# Patient Record
Sex: FEMALE | Race: WHITE | Employment: OTHER | URBAN - METROPOLITAN AREA
[De-identification: names, ages, dates, MRNs, and addresses within clinical notes are randomized per-mention and may not be internally consistent; named-entity substitution may affect disease eponyms.]

---

## 2018-04-04 ENCOUNTER — TELEPHONE (OUTPATIENT)
Dept: ORTHOPEDIC SURGERY | Age: 83
End: 2018-04-04

## 2018-04-04 ENCOUNTER — OFFICE VISIT (OUTPATIENT)
Dept: ORTHOPEDIC SURGERY | Age: 83
End: 2018-04-04

## 2018-04-04 VITALS — WEIGHT: 184 LBS | BODY MASS INDEX: 33.86 KG/M2 | HEIGHT: 62 IN

## 2018-04-04 DIAGNOSIS — M17.12 PRIMARY OSTEOARTHRITIS OF LEFT KNEE: ICD-10-CM

## 2018-04-04 DIAGNOSIS — I73.9 CLAUDICATION OF LOWER EXTREMITY (HCC): Primary | ICD-10-CM

## 2018-04-04 DIAGNOSIS — M79.605 PAIN OF LEFT LOWER EXTREMITY: ICD-10-CM

## 2018-04-04 PROBLEM — Z86.73 HISTORY OF TRANSIENT ISCHEMIC ATTACK: Status: ACTIVE | Noted: 2017-07-17

## 2018-04-04 PROBLEM — Z79.01 ANTICOAGULATED: Status: ACTIVE | Noted: 2017-07-17

## 2018-04-04 PROBLEM — Z86.718 HISTORY OF THROMBOEMBOLISM: Status: ACTIVE | Noted: 2017-07-17

## 2018-04-04 PROBLEM — I16.0 HYPERTENSIVE URGENCY: Status: ACTIVE | Noted: 2017-07-17

## 2018-04-04 PROBLEM — H81.11 BENIGN PAROXYSMAL POSITIONAL VERTIGO OF RIGHT EAR: Status: ACTIVE | Noted: 2017-07-16

## 2018-04-04 PROBLEM — E03.9 HYPOTHYROIDISM: Status: ACTIVE | Noted: 2017-07-17

## 2018-04-04 PROCEDURE — 1090F PRES/ABSN URINE INCON ASSESS: CPT | Performed by: ORTHOPAEDIC SURGERY

## 2018-04-04 PROCEDURE — G8427 DOCREV CUR MEDS BY ELIG CLIN: HCPCS | Performed by: ORTHOPAEDIC SURGERY

## 2018-04-04 PROCEDURE — G8400 PT W/DXA NO RESULTS DOC: HCPCS | Performed by: ORTHOPAEDIC SURGERY

## 2018-04-04 PROCEDURE — 1123F ACP DISCUSS/DSCN MKR DOCD: CPT | Performed by: ORTHOPAEDIC SURGERY

## 2018-04-04 PROCEDURE — G8417 CALC BMI ABV UP PARAM F/U: HCPCS | Performed by: ORTHOPAEDIC SURGERY

## 2018-04-04 PROCEDURE — 4040F PNEUMOC VAC/ADMIN/RCVD: CPT | Performed by: ORTHOPAEDIC SURGERY

## 2018-04-04 PROCEDURE — 99204 OFFICE O/P NEW MOD 45 MIN: CPT | Performed by: ORTHOPAEDIC SURGERY

## 2018-04-04 PROCEDURE — 1036F TOBACCO NON-USER: CPT | Performed by: ORTHOPAEDIC SURGERY

## 2018-04-04 RX ORDER — SIMVASTATIN 20 MG
20 TABLET ORAL
Status: ON HOLD | COMMUNITY
End: 2019-04-12 | Stop reason: ALTCHOICE

## 2018-04-04 RX ORDER — LEVOTHYROXINE SODIUM 88 MCG
TABLET ORAL
Status: ON HOLD | COMMUNITY
Start: 2018-01-15 | End: 2019-04-13 | Stop reason: SDUPTHER

## 2018-04-04 RX ORDER — LIDOCAINE 50 MG/G
OINTMENT TOPICAL
COMMUNITY
Start: 2018-04-03 | End: 2018-04-04 | Stop reason: SDUPTHER

## 2018-04-04 ASSESSMENT — ENCOUNTER SYMPTOMS
BACK PAIN: 1
RESPIRATORY NEGATIVE: 1
HEARTBURN: 1
EYES NEGATIVE: 1

## 2018-04-10 ENCOUNTER — OFFICE VISIT (OUTPATIENT)
Dept: ORTHOPEDIC SURGERY | Age: 83
End: 2018-04-10

## 2018-04-10 VITALS — BODY MASS INDEX: 33.86 KG/M2 | HEIGHT: 62 IN | RESPIRATION RATE: 16 BRPM | WEIGHT: 184 LBS

## 2018-04-10 DIAGNOSIS — M17.12 PRIMARY OSTEOARTHRITIS OF LEFT KNEE: Primary | ICD-10-CM

## 2018-04-10 PROCEDURE — 4040F PNEUMOC VAC/ADMIN/RCVD: CPT | Performed by: ORTHOPAEDIC SURGERY

## 2018-04-10 PROCEDURE — 20610 DRAIN/INJ JOINT/BURSA W/O US: CPT | Performed by: ORTHOPAEDIC SURGERY

## 2018-04-10 PROCEDURE — 1036F TOBACCO NON-USER: CPT | Performed by: ORTHOPAEDIC SURGERY

## 2018-04-10 PROCEDURE — 99213 OFFICE O/P EST LOW 20 MIN: CPT | Performed by: ORTHOPAEDIC SURGERY

## 2018-04-10 PROCEDURE — 1123F ACP DISCUSS/DSCN MKR DOCD: CPT | Performed by: ORTHOPAEDIC SURGERY

## 2018-04-10 PROCEDURE — G8400 PT W/DXA NO RESULTS DOC: HCPCS | Performed by: ORTHOPAEDIC SURGERY

## 2018-04-10 PROCEDURE — G8427 DOCREV CUR MEDS BY ELIG CLIN: HCPCS | Performed by: ORTHOPAEDIC SURGERY

## 2018-04-10 PROCEDURE — G8417 CALC BMI ABV UP PARAM F/U: HCPCS | Performed by: ORTHOPAEDIC SURGERY

## 2018-04-10 PROCEDURE — 1090F PRES/ABSN URINE INCON ASSESS: CPT | Performed by: ORTHOPAEDIC SURGERY

## 2018-04-10 RX ORDER — HYDROCODONE BITARTRATE AND ACETAMINOPHEN 5; 325 MG/1; MG/1
TABLET ORAL
Status: ON HOLD | COMMUNITY
Start: 2018-04-05 | End: 2019-04-12 | Stop reason: ALTCHOICE

## 2018-04-10 RX ORDER — IBUPROFEN 400 MG/1
TABLET ORAL
COMMUNITY
Start: 2018-04-05 | End: 2018-04-10 | Stop reason: SINTOL

## 2018-04-10 ASSESSMENT — ENCOUNTER SYMPTOMS
RESPIRATORY NEGATIVE: 1
EYES NEGATIVE: 1
HEARTBURN: 1
BACK PAIN: 1

## 2019-04-12 ENCOUNTER — HOSPITAL ENCOUNTER (OUTPATIENT)
Age: 84
Setting detail: OBSERVATION
Discharge: HOME | End: 2019-04-13
Attending: EMERGENCY MEDICINE | Admitting: HOSPITALIST
Payer: MEDICARE

## 2019-04-12 ENCOUNTER — APPOINTMENT (OUTPATIENT)
Dept: GENERAL RADIOLOGY | Age: 84
End: 2019-04-12
Payer: MEDICARE

## 2019-04-12 ENCOUNTER — APPOINTMENT (OUTPATIENT)
Dept: CT IMAGING | Age: 84
End: 2019-04-12
Payer: MEDICARE

## 2019-04-12 ENCOUNTER — APPOINTMENT (OUTPATIENT)
Dept: ULTRASOUND IMAGING | Age: 84
End: 2019-04-12
Payer: MEDICARE

## 2019-04-12 DIAGNOSIS — R42 VERTIGO: ICD-10-CM

## 2019-04-12 DIAGNOSIS — R53.83 OTHER FATIGUE: ICD-10-CM

## 2019-04-12 DIAGNOSIS — M54.2 NECK PAIN: ICD-10-CM

## 2019-04-12 DIAGNOSIS — R42 DIZZINESS: Primary | ICD-10-CM

## 2019-04-12 PROBLEM — I50.9 MILD CONGESTIVE HEART FAILURE (HCC): Status: ACTIVE | Noted: 2019-04-12

## 2019-04-12 PROBLEM — R29.90 STROKE-LIKE SYMPTOMS: Status: ACTIVE | Noted: 2019-04-12

## 2019-04-12 LAB
ALBUMIN SERPL-MCNC: 3 GM/DL (ref 3.4–5)
ALP BLD-CCNC: 82 IU/L (ref 40–129)
ALT SERPL-CCNC: 14 U/L (ref 10–40)
ANION GAP SERPL CALCULATED.3IONS-SCNC: 10 MMOL/L (ref 4–16)
AST SERPL-CCNC: 13 IU/L (ref 15–37)
BACTERIA: NEGATIVE /HPF
BANDED NEUTROPHILS ABSOLUTE COUNT: 0.11 K/CU MM
BANDED NEUTROPHILS RELATIVE PERCENT: 2 % (ref 5–11)
BILIRUB SERPL-MCNC: 0.7 MG/DL (ref 0–1)
BILIRUBIN URINE: NEGATIVE MG/DL
BLOOD, URINE: NEGATIVE
BUN BLDV-MCNC: 21 MG/DL (ref 6–23)
CALCIUM SERPL-MCNC: 8.1 MG/DL (ref 8.3–10.6)
CHLORIDE BLD-SCNC: 103 MMOL/L (ref 99–110)
CHP ED QC CHECK: YES
CLARITY: CLEAR
CO2: 22 MMOL/L (ref 21–32)
COLOR: ABNORMAL
CREAT SERPL-MCNC: 0.8 MG/DL (ref 0.6–1.1)
DIFFERENTIAL TYPE: ABNORMAL
EOSINOPHILS ABSOLUTE: 0.1 K/CU MM
EOSINOPHILS RELATIVE PERCENT: 1 % (ref 0–3)
GFR AFRICAN AMERICAN: >60 ML/MIN/1.73M2
GFR NON-AFRICAN AMERICAN: >60 ML/MIN/1.73M2
GLUCOSE BLD-MCNC: 129 MG/DL
GLUCOSE BLD-MCNC: 129 MG/DL (ref 70–99)
GLUCOSE BLD-MCNC: 136 MG/DL (ref 70–99)
GLUCOSE, URINE: NEGATIVE MG/DL
HCT VFR BLD CALC: 35.7 % (ref 37–47)
HEMOGLOBIN: 11.5 GM/DL (ref 12.5–16)
INR BLD: 1.55 INDEX
KETONES, URINE: NEGATIVE MG/DL
LEUKOCYTE ESTERASE, URINE: ABNORMAL
LYMPHOCYTES ABSOLUTE: 1 K/CU MM
LYMPHOCYTES RELATIVE PERCENT: 18 % (ref 24–44)
MCH RBC QN AUTO: 31.2 PG (ref 27–31)
MCHC RBC AUTO-ENTMCNC: 32.2 % (ref 32–36)
MCV RBC AUTO: 96.7 FL (ref 78–100)
MONOCYTES ABSOLUTE: 0.4 K/CU MM
MONOCYTES RELATIVE PERCENT: 7 % (ref 0–4)
NITRITE URINE, QUANTITATIVE: NEGATIVE
PDW BLD-RTO: 13 % (ref 11.7–14.9)
PH, URINE: 7 (ref 5–8)
PLATELET # BLD: 149 K/CU MM (ref 140–440)
PLT MORPHOLOGY: ABNORMAL
PMV BLD AUTO: 9.6 FL (ref 7.5–11.1)
POLYCHROMASIA: ABNORMAL
POTASSIUM SERPL-SCNC: 3.8 MMOL/L (ref 3.5–5.1)
PRO-BNP: 2614 PG/ML
PROTEIN UA: NEGATIVE MG/DL
PROTHROMBIN TIME: 17.6 SECONDS (ref 9.12–12.5)
RBC # BLD: 3.69 M/CU MM (ref 4.2–5.4)
RBC # BLD: ABNORMAL 10*6/UL
RBC URINE: 1 /HPF (ref 0–6)
SEGMENTED NEUTROPHILS ABSOLUTE COUNT: 3.8 K/CU MM
SEGMENTED NEUTROPHILS RELATIVE PERCENT: 72 % (ref 36–66)
SODIUM BLD-SCNC: 135 MMOL/L (ref 135–145)
SPECIFIC GRAVITY UA: 1.04 (ref 1–1.03)
SPECIFIC GRAVITY UA: ABNORMAL (ref 1–1.03)
SQUAMOUS EPITHELIAL: 3 /HPF
TOTAL PROTEIN: 5.3 GM/DL (ref 6.4–8.2)
TRICHOMONAS: ABNORMAL /HPF
TROPONIN T: <0.01 NG/ML
TROPONIN T: <0.01 NG/ML
TSH HIGH SENSITIVITY: 0.49 UIU/ML (ref 0.27–4.2)
UROBILINOGEN, URINE: NORMAL MG/DL (ref 0.2–1)
WBC # BLD: 5.4 K/CU MM (ref 4–10.5)
WBC # BLD: ABNORMAL 10*3/UL
WBC UA: <1 /HPF (ref 0–5)
YEAST: ABNORMAL /HPF

## 2019-04-12 PROCEDURE — 70496 CT ANGIOGRAPHY HEAD: CPT

## 2019-04-12 PROCEDURE — 85610 PROTHROMBIN TIME: CPT

## 2019-04-12 PROCEDURE — 2580000003 HC RX 258: Performed by: PHYSICIAN ASSISTANT

## 2019-04-12 PROCEDURE — 83880 ASSAY OF NATRIURETIC PEPTIDE: CPT

## 2019-04-12 PROCEDURE — 82962 GLUCOSE BLOOD TEST: CPT

## 2019-04-12 PROCEDURE — 6370000000 HC RX 637 (ALT 250 FOR IP): Performed by: NURSE PRACTITIONER

## 2019-04-12 PROCEDURE — 85027 COMPLETE CBC AUTOMATED: CPT

## 2019-04-12 PROCEDURE — 70498 CT ANGIOGRAPHY NECK: CPT

## 2019-04-12 PROCEDURE — G0378 HOSPITAL OBSERVATION PER HR: HCPCS

## 2019-04-12 PROCEDURE — 36415 COLL VENOUS BLD VENIPUNCTURE: CPT

## 2019-04-12 PROCEDURE — 70450 CT HEAD/BRAIN W/O DYE: CPT

## 2019-04-12 PROCEDURE — 85007 BL SMEAR W/DIFF WBC COUNT: CPT

## 2019-04-12 PROCEDURE — 99285 EMERGENCY DEPT VISIT HI MDM: CPT

## 2019-04-12 PROCEDURE — 93005 ELECTROCARDIOGRAM TRACING: CPT | Performed by: PHYSICIAN ASSISTANT

## 2019-04-12 PROCEDURE — 2580000003 HC RX 258: Performed by: NURSE PRACTITIONER

## 2019-04-12 PROCEDURE — 80053 COMPREHEN METABOLIC PANEL: CPT

## 2019-04-12 PROCEDURE — 81001 URINALYSIS AUTO W/SCOPE: CPT

## 2019-04-12 PROCEDURE — 84484 ASSAY OF TROPONIN QUANT: CPT

## 2019-04-12 PROCEDURE — 84443 ASSAY THYROID STIM HORMONE: CPT

## 2019-04-12 PROCEDURE — 96374 THER/PROPH/DIAG INJ IV PUSH: CPT

## 2019-04-12 PROCEDURE — 71045 X-RAY EXAM CHEST 1 VIEW: CPT

## 2019-04-12 PROCEDURE — 6360000004 HC RX CONTRAST MEDICATION: Performed by: PHYSICIAN ASSISTANT

## 2019-04-12 PROCEDURE — 6360000002 HC RX W HCPCS: Performed by: NURSE PRACTITIONER

## 2019-04-12 PROCEDURE — 93880 EXTRACRANIAL BILAT STUDY: CPT

## 2019-04-12 PROCEDURE — 93010 ELECTROCARDIOGRAM REPORT: CPT | Performed by: INTERNAL MEDICINE

## 2019-04-12 RX ORDER — ATORVASTATIN CALCIUM 10 MG/1
10 TABLET, FILM COATED ORAL DAILY
Status: DISCONTINUED | OUTPATIENT
Start: 2019-04-12 | End: 2019-04-13 | Stop reason: HOSPADM

## 2019-04-12 RX ORDER — ATORVASTATIN CALCIUM 10 MG/1
10 TABLET, FILM COATED ORAL DAILY
COMMUNITY

## 2019-04-12 RX ORDER — FAMOTIDINE 20 MG/1
20 TABLET, FILM COATED ORAL 2 TIMES DAILY
Status: DISCONTINUED | OUTPATIENT
Start: 2019-04-12 | End: 2019-04-13 | Stop reason: HOSPADM

## 2019-04-12 RX ORDER — ACETAMINOPHEN 80 MG
TABLET,CHEWABLE ORAL
Status: DISPENSED
Start: 2019-04-12 | End: 2019-04-13

## 2019-04-12 RX ORDER — LABETALOL HYDROCHLORIDE 5 MG/ML
10 INJECTION, SOLUTION INTRAVENOUS EVERY 10 MIN PRN
Status: DISCONTINUED | OUTPATIENT
Start: 2019-04-12 | End: 2019-04-13 | Stop reason: HOSPADM

## 2019-04-12 RX ORDER — SODIUM CHLORIDE 0.9 % (FLUSH) 0.9 %
10 SYRINGE (ML) INJECTION EVERY 12 HOURS SCHEDULED
Status: DISCONTINUED | OUTPATIENT
Start: 2019-04-12 | End: 2019-04-13 | Stop reason: HOSPADM

## 2019-04-12 RX ORDER — LEVOTHYROXINE SODIUM ANHYDROUS 100 UG/5ML
88 INJECTION, POWDER, LYOPHILIZED, FOR SOLUTION INTRAVENOUS
Status: DISCONTINUED | OUTPATIENT
Start: 2019-04-13 | End: 2019-04-12 | Stop reason: RX

## 2019-04-12 RX ORDER — SODIUM CHLORIDE 0.9 % (FLUSH) 0.9 %
10 SYRINGE (ML) INJECTION PRN
Status: DISCONTINUED | OUTPATIENT
Start: 2019-04-12 | End: 2019-04-13 | Stop reason: HOSPADM

## 2019-04-12 RX ORDER — FUROSEMIDE 10 MG/ML
20 INJECTION INTRAMUSCULAR; INTRAVENOUS 2 TIMES DAILY
Status: DISCONTINUED | OUTPATIENT
Start: 2019-04-12 | End: 2019-04-13

## 2019-04-12 RX ORDER — MECLIZINE HCL 12.5 MG/1
12.5 TABLET ORAL 3 TIMES DAILY PRN
Status: DISCONTINUED | OUTPATIENT
Start: 2019-04-12 | End: 2019-04-13 | Stop reason: HOSPADM

## 2019-04-12 RX ORDER — FUROSEMIDE 20 MG/1
20 TABLET ORAL 2 TIMES DAILY
COMMUNITY

## 2019-04-12 RX ORDER — 0.9 % SODIUM CHLORIDE 0.9 %
10 VIAL (ML) INJECTION
Status: COMPLETED | OUTPATIENT
Start: 2019-04-12 | End: 2019-04-12

## 2019-04-12 RX ORDER — LEVOTHYROXINE SODIUM 88 UG/1
88 TABLET ORAL DAILY
Status: DISCONTINUED | OUTPATIENT
Start: 2019-04-13 | End: 2019-04-13 | Stop reason: HOSPADM

## 2019-04-12 RX ORDER — ERGOCALCIFEROL (VITAMIN D2) 1250 MCG
50000 CAPSULE ORAL WEEKLY
COMMUNITY

## 2019-04-12 RX ORDER — ERGOCALCIFEROL 1.25 MG/1
50000 CAPSULE ORAL WEEKLY
Status: DISCONTINUED | OUTPATIENT
Start: 2019-04-12 | End: 2019-04-13 | Stop reason: HOSPADM

## 2019-04-12 RX ORDER — ONDANSETRON 2 MG/ML
4 INJECTION INTRAMUSCULAR; INTRAVENOUS EVERY 6 HOURS PRN
Status: DISCONTINUED | OUTPATIENT
Start: 2019-04-12 | End: 2019-04-13 | Stop reason: HOSPADM

## 2019-04-12 RX ADMIN — IOPAMIDOL 100 ML: 755 INJECTION, SOLUTION INTRAVENOUS at 10:47

## 2019-04-12 RX ADMIN — FUROSEMIDE 20 MG: 10 INJECTION, SOLUTION INTRAVENOUS at 17:55

## 2019-04-12 RX ADMIN — SODIUM CHLORIDE, PRESERVATIVE FREE 10 ML: 5 INJECTION INTRAVENOUS at 22:13

## 2019-04-12 RX ADMIN — FAMOTIDINE 20 MG: 20 TABLET ORAL at 22:12

## 2019-04-12 RX ADMIN — METOPROLOL TARTRATE 12.5 MG: 25 TABLET ORAL at 22:11

## 2019-04-12 RX ADMIN — SODIUM CHLORIDE 10 ML: 9 INJECTION, SOLUTION INTRAMUSCULAR; INTRAVENOUS; SUBCUTANEOUS at 10:47

## 2019-04-12 RX ADMIN — APIXABAN 5 MG: 5 TABLET, FILM COATED ORAL at 22:11

## 2019-04-12 RX ADMIN — ATORVASTATIN CALCIUM 10 MG: 10 TABLET, FILM COATED ORAL at 22:11

## 2019-04-12 RX ADMIN — ERGOCALCIFEROL 50000 UNITS: 1.25 CAPSULE ORAL at 17:55

## 2019-04-12 RX ADMIN — SODIUM CHLORIDE, PRESERVATIVE FREE 10 ML: 5 INJECTION INTRAVENOUS at 17:56

## 2019-04-12 ASSESSMENT — PAIN DESCRIPTION - PAIN TYPE
TYPE: ACUTE PAIN
TYPE: ACUTE PAIN

## 2019-04-12 ASSESSMENT — PAIN DESCRIPTION - LOCATION
LOCATION: NECK
LOCATION: BACK;NECK

## 2019-04-12 ASSESSMENT — PAIN DESCRIPTION - ORIENTATION
ORIENTATION: LEFT
ORIENTATION: LEFT

## 2019-04-12 ASSESSMENT — PAIN SCALES - GENERAL
PAINLEVEL_OUTOF10: 3
PAINLEVEL_OUTOF10: 3

## 2019-04-12 ASSESSMENT — PAIN DESCRIPTION - PROGRESSION: CLINICAL_PROGRESSION: NOT CHANGED

## 2019-04-12 ASSESSMENT — PAIN DESCRIPTION - DESCRIPTORS: DESCRIPTORS: ACHING

## 2019-04-12 ASSESSMENT — PAIN DESCRIPTION - ONSET: ONSET: ON-GOING

## 2019-04-12 ASSESSMENT — PAIN DESCRIPTION - FREQUENCY: FREQUENCY: CONTINUOUS

## 2019-04-12 NOTE — ED TRIAGE NOTES
Patient presents to ER today for c/o dizziness that started this morning. patient also reports nausea and vomiting, but was given 4 mg of IV zofran PTa and states nausea has subsided.  Patient c/o left sided dull neck pain starting this morning as well

## 2019-04-12 NOTE — ED PROVIDER NOTES
made by myself in conjunction with the [facility]. For all further details of the patient's emergency department visit, please see their documentation.     (Please note that portions of this note may have been completed with a voice recognition program. Efforts were made to edit the dictations but occasionally words are mis-transcribed.)    MD Brian Thurman MD  04/12/19 [id number]

## 2019-04-12 NOTE — ED PROVIDER NOTES
Did not see patient, troponin EKG only    The Ekg interpreted by me shows  atrial fibrillation with a rate of 81  Axis is   Normal  QTc is  normal  Intervals and Durations are unremarkable.       ST Segments: no acute change  No significant change from prior EKG dated 1-           Ilya Medina MD  04/12/19 2513

## 2019-04-12 NOTE — ED NOTES
Bed: ED-26  Expected date:   Expected time:   Means of arrival:   Comments:  ryder Willson RN  04/12/19 1016

## 2019-04-12 NOTE — ED PROVIDER NOTES
Patient Identification  Tony Ortez is a 80 y.o. female    Chief Complaint  Dizziness (HX of vertigo); Nausea (given 4mg IV zofran pta by ems, states nausea has subsided.); and Neck Pain (left sided dull ache)      HPI  (History provided by patient)  This is a 80 y.o. female with h/o BPPV, CKD, CVA, a fib who was brought in by EMS for chief complaint of dizziness, nausea, neck pain. Patient reports she went to bed at approximately midnight last night. She woke up this morning feeling lightheaded, reports intermittent spinning sensation. She has a history of vertigo in the past and states this feels different. She states that she feels as though she just can't get out of bed, feels generally weak, denies lateralization. Also notes pain in the left side of her neck that is a dull ache that is 3 out of 10, reports when she turns her head to the left her dizziness worsens and pain worsens. She notes that this morning she had some nausea but reports this is improved, notes upper abdominal \"aching\" this morning that has resolved. Denies chest pain or shortness of breath. No coughing. No falls. No sick contacts. REVIEW OF SYSTEMS    Constitutional:  Denies fever, chills.  + dizziness, fatigue, general weakness  HENT:  Denies sore throat or ear pain   Eyes: Denies vision changes, eye pain  Cardiovascular:  Denies chest pain, syncope  Respiratory:  Denies shortness of breath, cough   GI:  Denies abdominal pain, vomiting.  + abdominal pain  :  Denies dysuria, discharge  Musculoskeletal:  Denies joint pain. + neck pain  Skin:  Denies rash, pruritis  Neurologic:  Denies headache, focal weakness, or sensory changes     See HPI and nursing notes for additional information     I have reviewed the following nursing documentation:  Allergies:    Allergies   Allergen Reactions    Asa [Aspirin] Anaphylaxis    Guaifenesin Other (See Comments)     intolerance    Phenylephrine Other (See Comments)     unknown tablet Take 600 mg by mouth 2 times daily    Historical Provider, MD   benzonatate (TESSALON) 100 MG capsule Take 100 mg by mouth 3 times daily as needed for Cough    Historical Provider, MD       Social history:  reports that she has quit smoking. She has never used smokeless tobacco. She reports that she does not drink alcohol or use drugs. Family history:    Family History   Problem Relation Age of Onset    Stroke Mother     Stroke Father     Diabetes Sister     Diabetes Maternal Grandmother          Exam  /87   Pulse 79   Temp 97.4 °F (36.3 °C) (Oral)   Resp 8   Wt 184 lb (83.5 kg)   LMP  (Approximate)   SpO2 100%   BMI 33.65 kg/m²   Nursing note and vitals reviewed. Constitutional: Well developed, well nourished. No acute distress. HENT:      Head: Normocephalic and atraumatic. Ears: External ears normal.      Nose: Nose normal.     Mouth: Membrane mucosa moist and pink. No posterior oropharynx erythema or tonsillar edema  Eyes: Anicteric sclera. No discharge, PERRL  Neck: Supple. Trachea midline. Patient has full range of motion of the neck, has mild tenderness to palpation over the left sternocleidomastoid. No carotid bruits noted bilaterally. Cardiovascular: Irregularly irregular rhythm, normal rate, no murmurs, rubs, or gallops, radial pulses 2+ bilaterally. DP and PT pulses 2+ bilat. Pulmonary/Chest: Effort normal. No respiratory distress. CTAB. No stridor. No wheezes. No rales. Abdominal: Soft. Nontender to palpation. No distension. No guarding, rebound tenderness, or evidence of ascites. : No CVA tenderness. Musculoskeletal: Moves all extremities. No gross deformity. No tenderness to palpation of the cervical or thoracic spine. Patient does have mild tenderness to palpation over the midline lumbar spine approximately L3-L4. No step-offs or deformities. No paraspinal tenderness. NEUROLOGICAL: Awake and alert. GCS 15.  Cranial nerves 2-12 grossly intact. Strength 5/5 throughout. Light touch sensation intact throughout. Finger to nose WNL. ROSARIO intact bilat. Skin: Warm and dry. No rash. Psychiatric: Normal mood and affect. Behavior is normal.      EKG   Please see Dr. Otf Bassett note for EKG read. Radiographs (if obtained):  [] The following radiograph was interpreted by myself in the absence of a radiologist:   [x] Radiologist's Report Reviewed:  XR CHEST PORTABLE   Final Result   The chest appears clear without acute cardiopulmonary process. CTA NECK W CONTRAST   Preliminary Result   1. No hemodynamically significant stenosis or branch occlusion in the   cervical or intracranial arterial circulation. 2. Beaded appearance of the internal carotid arteries bilaterally, left more   than right compatible with fibromuscular dysplasia. 3. 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 CTA HEAD W CONTRAST   Preliminary Result   1. No hemodynamically significant stenosis or branch occlusion in the   cervical or intracranial arterial circulation. 2. Beaded appearance of the internal carotid arteries bilaterally, left more   than right compatible with fibromuscular dysplasia. 3. 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 CT Head WO Contrast   Final Result   No acute intracranial process identified. The above findings were discussed Dr. Evin Cheatham at 11 a.m. on 04/12/2019.                 Labs  Results for orders placed or performed during the hospital encounter of 04/12/19   Troponin   Result Value Ref Range    Troponin T <0.010 <0.01 NG/ML   CBC Auto Differential   Result Value Ref Range    WBC 5.4 4.0 - 10.5 K/CU MM    RBC 3.69 (L) 4.2 - 5.4 M/CU MM    Hemoglobin 11.5 (L) 12.5 - 16.0 GM/DL    Hematocrit 35.7 (L) 37 - 47 %    MCV 96.7 78 - 100 FL    MCH 31.2 (H) 27 - 31 PG    MCHC 32.2 32.0 - 36.0 %    RDW 13.0 11.7 - 14.9 %    Platelets 818 telemetry in stable condition. Patient was given scripts for the following medications. I counseled patient how to take these medications. New Prescriptions    No medications on file       This chart was generated using the [address] [place] [address] [address]  dictation system. I created this record but it may contain dictation errors given the limitations of this technology.         Mireya Jewell PA-C  04/12/19 9483

## 2019-04-12 NOTE — ED NOTES
Report called to [facility] on 3E. Pt to be transported to room 3004.      Jack Rowe RN  04/12/19 9020

## 2019-04-12 NOTE — H&P
History and Physical  Evelyn Morgan, [facility] [place]   Internal Medicine Hospitalist        Name:  Zaida Arreola /Age/Sex: 1934  (80 y.o. female)   MRN & CSN:  [id number] & [id number] Admission Date/Time: 2019  9:42 AM   Location:  ED26/ED-26 PCP: Charles Mccarthy Day: 1      Supervising Physician: Dr. Gloria Duke     Chief Complaint: Dizziness (HX of vertigo); Nausea (given 4mg IV zofran pta by ems, states nausea has subsided.); and Neck Pain (left sided dull ache)     Assessment and Plan: Zaida Arreola is a 80 y.o.  female who presents with Stroke-like symptoms     Stroke-like symptoms, r/o TIA/CVA - c/o dizziness, lightheadedness, generalized weakness, CT Head no acute abnormality, CTA head and neck shows stable cerebral aneurysm, NIH score/assessment- neg, initial trop negative, hemodynamically stable. - admit for obs        - trend trop        - start PRN antivert        - Neuro checks        - statin, allergic to statin and refused to take her Plavix        - PT/OT eval and treat        - swallow eval        - pending carotid B/L, MRI Brain w/o contrast        - check lab works in AM including rpt ECG     Mild CHF exacerbation - c/o B/L leg swelling, B/L LE +1 pitting edema, denied SOB and cp, initial trop neg, BNP-2,614 (last was 670.4), CXR- unremarkable, Last Echo (2017) EF estimated at 55%.      - Lasix BID       - ASA, statin, BB, ACEI        - daily weights, monitor I/O       - monitor BNP       - pending Echo       Chronic Illnesses: will CCM upon admission for the following diseases unless otherwise specified due to current chief complaint or issue:        - Aneurysm       - Vertigo       - CKD - stable. - hyperlipidemia - cont daily Lipitor.       - hypertension - JUDITH controlled, c/w home BP meds. - thyroid disease - cont synthroid.      Current diagnosis and plan of management discussed with the patient and family at the time of admission in lay language who agree(s) to the above plan and disposition of admission for further care. All concerns and questions addressed. Patient assessment and plan in conjunction with supervising physician - Dr. Desire Maddox for swallow eveal, then DIET CARDIAC;    DVT Prophylaxis [] Lovenox, []  Heparin, [x] SCDs, [] Ambulation  [] Long term AC  Patient is on Eliquis. GI Prophylaxis [x] PPI,  [] H2 Blocker,  [] Carafate,  [] Diet,  [] No GI prophylaxis, N/A: patient is not under significant medical stress, non-ICU or is receiving a diet/tube feeds   Code Status DNR-CC status, discussed with patient and family at bedside upon admission. Disposition Patient requires continued admission due to Stroke-like symptoms, r/o TIA/CVA, Mild CHF exacerbation . Discharge Plan: Patient plans to return home upon discharge. MDM [] Low, [x] Moderate,[]  High  Patient's risk as above due to:      [x] One or more chronic illnesses with mild exacerbation progression      [] Two or more stable chronic illnesses      [] Undiagnosed new problem with uncertain prognosis      [] Elective major surgery      []Prescription drug management     History of Present Illness:     Principal Problem: Stroke-like symptoms  Tony Ortez is a 80 y.o. female who presents to the ED with family for complaints of dizziness, nausea, neck pain, onset this morning and appears to be resolved in ED. Patient has PMH of Aneurysm, Vertigo, CKD, hyperlipidemia, hypertension, and thyroid disease. Patient reports that she went to bed last night feeling fine. But when she woke up this morning, she felt lightheaded, dizzy with intermittent spinning sensation. Patient stated that she has a history of vertigo but her dizziness today is different because she also felt generalized weakness pain on her neck. She denies lateralized weakness, vision change, slurred speech and headache. Pt seen and examined.  Patient denies CP, palpitation, fever, no hepatosplenomegaly. No petechiae or ecchymoses. MS  -B/L extremities strong muscles strength. Full movements. No gross joint deformities. B/L legs swelling, intact sensation symmetrical.   SKIN  -Normal coloration, warm, dry. No open wounds or ulcers. NEURO  -CN 2-12 appear grossly intact, normal speech, no lateralizing weakness. PSYC  -Awake, alert, oriented x 4. Appropriate affect. Past Medical History:      Past Medical History:   Diagnosis Date    Aneurysm (arteriovenous) of coronary vessels     Aneurysm ([facility] [address]     brain    Arthritis     Cancer ([facility] [address]     breast    Cerebral artery occlusion with cerebral infarction ([facility] [address]     tia in 2005    Chronic kidney disease     Hx of blood clots     2 in the lung 1 in left leg    Hyperlipidemia     Hypertension     Pneumonia     Thyroid disease     Vertigo      Past Surgery History:  Patient  has a past surgical history that includes Cholecystectomy; Appendectomy; Hysterectomy; and Vein bypass surgery. Social History:    FAM HX: Assessed: family history includes Diabetes in her maternal grandmother and sister; Stroke in her father and mother.   Soc HX:   Social History     Socioeconomic History    Marital status:      Spouse name: None    Number of children: None    Years of education: None    Highest education level: None   Occupational History    None   Social Needs    Financial resource strain: None    Food insecurity:     Worry: None     Inability: None    Transportation needs:     Medical: None     Non-medical: None   Tobacco Use    Smoking status: Former Smoker    Smokeless tobacco: Never Used   Substance and Sexual Activity    Alcohol use: No    Drug use: No    Sexual activity: None   Lifestyle    Physical activity:     Days per week: None     Minutes per session: None    Stress: None   Relationships    Social connections:     Talks on phone: None     Gets together: None     Attends Jainism service: None     Active member of club or organization: None     Attends meetings of clubs or organizations: None     Relationship status: None    Intimate partner violence:     Fear of current or ex partner: None     Emotionally abused: None     Physically abused: None     Forced sexual activity: None   Other Topics Concern    None   Social History Narrative    None     TOBACCO:   reports that she has quit smoking. She has never used smokeless tobacco.  ETOH:   reports that she does not drink alcohol. Drugs:  reports that she does not use drugs. Allergies: Allergies   Allergen Reactions    Asa [Aspirin] Anaphylaxis    Guaifenesin Other (See Comments)     intolerance    Phenylephrine Other (See Comments)     unknown     Medications:   Medications:    Infusions:   PRN Meds:   Prior to Admission Meds:  Prior to Admission medications    Medication Sig Start Date End Date Taking? Authorizing Provider   apixaban (ELIQUIS) 5 MG TABS tablet Take 5 mg by mouth 2 times daily   Yes Historical Provider, MD   furosemide (LASIX) 20 MG tablet Take 20 mg by mouth 2 times daily   Yes Historical Provider, MD   atorvastatin (LIPITOR) 10 MG tablet Take 10 mg by mouth daily   Yes Historical Provider, MD   ergocalciferol (ERGOCALCIFEROL) 21217 units capsule Take 50,000 Units by mouth once a week   Yes Historical Provider, MD   metoprolol tartrate (LOPRESSOR) 25 MG tablet  1/19/18  Yes Historical Provider, MD   SYNTHROID 88 MCG tablet  1/15/18  Yes Historical Provider, MD   clopidogrel (PLAVIX) 75 MG tablet Take 75 mg by mouth daily   Yes Historical Provider, MD [employer] [facility] MISC 1 Units by Does not apply route once for 1 dose 4/3/18 4/3/18  SUSAN Muir     Data:     Laboratory this visit:  Reviewed  Recent Labs     04/12/19  1043   WBC 5.4   HGB 11.5*   HCT 35.7*         Recent Labs     04/12/19  1043      K 3.8      CO2 22   BUN 21   CREATININE 0.8     Recent Labs     04/12/19  1043   AST 13*   ALT 14   BILITOT 0.7 ALKPHOS 82     Recent Labs     04/12/19  1043   INR 1.55     No results for input(s): CKTOTAL, CKMB, CKMBINDEX in the last 72 hours. Invalid input(s): Dario Link input(s): PRO-BNP    Radiology this visit:  Reviewed. Ct Head Wo Contrast    Result Date: 4/12/2019  EXAMINATION: CT OF THE HEAD WITHOUT CONTRAST  4/12/2019 10:35 am TECHNIQUE: CT of the head was performed without the administration of intravenous contrast. Dose modulation, iterative reconstruction, and/or weight based adjustment of the mA/kV was utilized to reduce the radiation dose to as low as reasonably achievable. COMPARISON: None. HISTORY: ORDERING SYSTEM PROVIDED HISTORY: STROKE TECHNOLOGIST PROVIDED HISTORY: Has a \"code stroke\" or \"stroke alert\" been called? ->Yes Ordering Physician Provided Reason for Exam: dizziness, weakness Acuity: Acute Type of Exam: Initial Additional signs and symptoms: stroke alert Relevant Medical/Surgical History: none FINDINGS: BRAIN/VENTRICLES: There is no acute infarct or acute intracranial hemorrhage present. There is no mass effect or midline shift present. There is no ventriculomegaly or abnormal extra-axial fluid collection present. There is mild periventricular hypoattenuation consistent with mild chronic small vessel ischemic changes. ORBITS: Limited evaluation of the orbits is unremarkable. SINUSES: The paranasal sinuses and mastoid air cells are clear. SOFT TISSUES/SKULL:  No lytic or blastic osseous lesions are identified. No acute intracranial process identified. The above findings were discussed Dr. Kim Conner at 11 a.m. on 04/12/2019.      Xr Chest Portable    Result Date: 4/12/2019  EXAMINATION: SINGLE XRAY VIEW OF THE CHEST 4/12/2019 10:15 am COMPARISON: 01/31/2017 HISTORY: ORDERING SYSTEM PROVIDED HISTORY: pain or SOB TECHNOLOGIST PROVIDED HISTORY: Reason for exam:->pain or SOB Ordering Physician Provided Reason for Exam: chest pain, weakness Acuity: Acute Type of Exam: Initial Relevant Medical/Surgical History: HTN FINDINGS: Cardiac and mediastinal silhouettes appear within normal limits for size. Pulmonary vascularity appears normal.  No parenchymal consolidation or pleural effusion is identified. No acute cardiopulmonary process. No acute osseous abnormality is identified. Degenerative changes are seen within the spine and shoulders. The chest appears clear without acute cardiopulmonary process. Cta Neck W Contrast    Result Date: 4/12/2019  EXAMINATION: CTA OF THE NECK; CTA OF THE HEAD WITH CONTRAST 4/12/2019 10:44 am TECHNIQUE: CTA of the neck was performed with the administration of intravenous contrast. Multiplanar reformatted images are provided for review. MIP images are provided for review. Stenosis of the internal carotid arteries measured using NASCET criteria. Dose modulation, iterative reconstruction, and/or weight based adjustment of the mA/kV was utilized to reduce the radiation dose to as low as reasonably achievable.; CTA of the head/brain was performed with the administration of intravenous contrast. Multiplanar reformatted images are provided for review. MIP images are provided for review. Dose modulation, iterative reconstruction, and/or weight based adjustment of the mA/kV was utilized to reduce the radiation dose to as low as reasonably achievable. COMPARISON: None HISTORY: ORDERING SYSTEM PROVIDED HISTORY: STROKE TECHNOLOGIST PROVIDED HISTORY: Has a \"code stroke\" or \"stroke alert\" been called? ->Yes Ordering Physician Provided Reason for Exam: dizziness, weakness Acuity: Acute Type of Exam: Initial Additional signs and symptoms: stroke alert Relevant Medical/Surgical History: contrats used from cta head; ORDERING SYSTEM PROVIDED HISTORY: STROKE TECHNOLOGIST PROVIDED HISTORY: Has a \"code stroke\" or \"stroke alert\" been called? ->Yes Ordering Physician Provided Reason for Exam: dizziness, weakness Acuity: Acute Type of Exam: Initial Additional signs and symptoms: stroke alert Relevant Medical/Surgical History: 100ml isovue 370 FINDINGS: CTA NECK: AORTIC ARCH/ARCH VESSELS: There is a normal branch pattern of the aortic arch. No significant stenosis is seen of the innominate artery or subclavian arteries. CAROTID ARTERIES: The common carotid arteries are without flow limiting stenosis. The internal carotid arteries demonstrate beaded appearance bilaterally, left more than right without flow limiting stenosis. No dissection or arterial injury is seen. VERTEBRAL ARTERIES: The vertebral arteries both arise from the subclavian arteries. The left vertebral artery is dominant. The right vertebral artery is non dominant. No flow-limiting stenosis in the cervical vertebral arteries. SOFT TISSUES: The lung apices are clear. No cervical or superior mediastinal lymphadenopathy. The visualized portion of the larynx and pharynx appear unremarkable. The parotid, submandibular and thyroid glands demonstrate no acute abnormality. The thyroid gland is atrophic. BONES: Multilevel degenerative disc disease throughout the cervical spine. CTA HEAD: ANTERIOR CIRCULATION: The internal carotid arteries are normal in course and caliber without focal stenosis. Calcified atherosclerotic plaque in the cavernous segments of the internal carotid arteries bilaterally. The anterior cerebral and middle cerebral arteries demonstrate no focal stenosis. 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 POSTERIOR CIRCULATION: The left vertebral artery is dominant intracranially. The right vertebral artery is nondominant and terminates primarily as a PICA. The basilar artery and posterior cerebral arteries are patent without focal stenosis. BRAIN: No mass effect or midline shift. No abnormal extra-axial fluid collection. The gray-white differentiation appears grossly maintained.      1. 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 EKG this visit:   EKG: Atrial fibrillation, rate 81. Abnormal ECG. When compared with ECG of 29-JAN-2017 18:36, Atrial fibrillation has replaced Sinus rhythm. Nonspecific T wave abnormality now evident in Lateral leads. Current Treatment Team:  Treatment Team: Attending Provider: Oliver Higgins MD; Consulting Physician: DOROTHY Chopra CNP;  Registered Nurse: GRAHAM Leo APRN-BC [facility]  4/12/2019 2:05 PM      Electronically signed by DOROTHY Chopra CNP on 4/12/2019 at 2:05 PM

## 2019-04-12 NOTE — CODE DOCUMENTATION
Dr Maikel Mayberry is on the phone speaking with Dr Natasha [facility] neurologist) at this time regarding patient.

## 2019-04-13 ENCOUNTER — APPOINTMENT (OUTPATIENT)
Dept: MRI IMAGING | Age: 84
End: 2019-04-13
Payer: MEDICARE

## 2019-04-13 VITALS
TEMPERATURE: 96 F | BODY MASS INDEX: 34.83 KG/M2 | SYSTOLIC BLOOD PRESSURE: 108 MMHG | RESPIRATION RATE: 21 BRPM | HEART RATE: 91 BPM | WEIGHT: 189.3 LBS | OXYGEN SATURATION: 100 % | HEIGHT: 62 IN | DIASTOLIC BLOOD PRESSURE: 63 MMHG

## 2019-04-13 LAB
ANION GAP SERPL CALCULATED.3IONS-SCNC: 11 MMOL/L (ref 4–16)
BASOPHILS ABSOLUTE: 0 K/CU MM
BASOPHILS RELATIVE PERCENT: 0.6 % (ref 0–1)
BUN BLDV-MCNC: 19 MG/DL (ref 6–23)
CALCIUM SERPL-MCNC: 9 MG/DL (ref 8.3–10.6)
CHLORIDE BLD-SCNC: 107 MMOL/L (ref 99–110)
CHOLESTEROL: 150 MG/DL
CO2: 26 MMOL/L (ref 21–32)
CREAT SERPL-MCNC: 1 MG/DL (ref 0.6–1.1)
DIFFERENTIAL TYPE: ABNORMAL
EOSINOPHILS ABSOLUTE: 0.1 K/CU MM
EOSINOPHILS RELATIVE PERCENT: 1.4 % (ref 0–3)
GFR AFRICAN AMERICAN: >60 ML/MIN/1.73M2
GFR NON-AFRICAN AMERICAN: 53 ML/MIN/1.73M2
GLUCOSE BLD-MCNC: 126 MG/DL (ref 70–99)
HCT VFR BLD CALC: 42.8 % (ref 37–47)
HDLC SERPL-MCNC: 42 MG/DL
HEMOGLOBIN: 13.5 GM/DL (ref 12.5–16)
IMMATURE NEUTROPHIL %: 0.4 % (ref 0–0.43)
LDL CHOLESTEROL DIRECT: 91 MG/DL
LYMPHOCYTES ABSOLUTE: 1.3 K/CU MM
LYMPHOCYTES RELATIVE PERCENT: 25.4 % (ref 24–44)
MCH RBC QN AUTO: 30.5 PG (ref 27–31)
MCHC RBC AUTO-ENTMCNC: 31.5 % (ref 32–36)
MCV RBC AUTO: 96.6 FL (ref 78–100)
MONOCYTES ABSOLUTE: 0.4 K/CU MM
MONOCYTES RELATIVE PERCENT: 7.7 % (ref 0–4)
NUCLEATED RBC %: 0 %
PDW BLD-RTO: 13.1 % (ref 11.7–14.9)
PLATELET # BLD: 186 K/CU MM (ref 140–440)
PMV BLD AUTO: 9.7 FL (ref 7.5–11.1)
POTASSIUM SERPL-SCNC: 4 MMOL/L (ref 3.5–5.1)
PRO-BNP: 2646 PG/ML
RBC # BLD: 4.43 M/CU MM (ref 4.2–5.4)
SEGMENTED NEUTROPHILS ABSOLUTE COUNT: 3.2 K/CU MM
SEGMENTED NEUTROPHILS RELATIVE PERCENT: 64.5 % (ref 36–66)
SODIUM BLD-SCNC: 144 MMOL/L (ref 135–145)
TOTAL IMMATURE NEUTOROPHIL: 0.02 K/CU MM
TOTAL NUCLEATED RBC: 0 K/CU MM
TRIGL SERPL-MCNC: 150 MG/DL
TROPONIN T: <0.01 NG/ML
WBC # BLD: 4.9 K/CU MM (ref 4–10.5)

## 2019-04-13 PROCEDURE — 85025 COMPLETE CBC W/AUTO DIFF WBC: CPT

## 2019-04-13 PROCEDURE — 97161 PT EVAL LOW COMPLEX 20 MIN: CPT

## 2019-04-13 PROCEDURE — 6370000000 HC RX 637 (ALT 250 FOR IP): Performed by: NURSE PRACTITIONER

## 2019-04-13 PROCEDURE — 80061 LIPID PANEL: CPT

## 2019-04-13 PROCEDURE — 97166 OT EVAL MOD COMPLEX 45 MIN: CPT

## 2019-04-13 PROCEDURE — 70551 MRI BRAIN STEM W/O DYE: CPT

## 2019-04-13 PROCEDURE — 6360000002 HC RX W HCPCS: Performed by: NURSE PRACTITIONER

## 2019-04-13 PROCEDURE — 84484 ASSAY OF TROPONIN QUANT: CPT

## 2019-04-13 PROCEDURE — 94761 N-INVAS EAR/PLS OXIMETRY MLT: CPT

## 2019-04-13 PROCEDURE — 2580000003 HC RX 258: Performed by: NURSE PRACTITIONER

## 2019-04-13 PROCEDURE — G0378 HOSPITAL OBSERVATION PER HR: HCPCS

## 2019-04-13 PROCEDURE — 80048 BASIC METABOLIC PNL TOTAL CA: CPT

## 2019-04-13 PROCEDURE — 83880 ASSAY OF NATRIURETIC PEPTIDE: CPT

## 2019-04-13 PROCEDURE — 36415 COLL VENOUS BLD VENIPUNCTURE: CPT

## 2019-04-13 PROCEDURE — 97535 SELF CARE MNGMENT TRAINING: CPT

## 2019-04-13 PROCEDURE — 83721 ASSAY OF BLOOD LIPOPROTEIN: CPT

## 2019-04-13 PROCEDURE — 96376 TX/PRO/DX INJ SAME DRUG ADON: CPT

## 2019-04-13 RX ORDER — LEVOTHYROXINE SODIUM 88 MCG
88 TABLET ORAL DAILY
Qty: 30 TABLET | Refills: 3
Start: 2019-04-13

## 2019-04-13 RX ORDER — MECLIZINE HCL 12.5 MG/1
12.5 TABLET ORAL 3 TIMES DAILY PRN
Qty: 10 TABLET | Refills: 0 | Status: SHIPPED | OUTPATIENT
Start: 2019-04-13 | End: 2019-04-23

## 2019-04-13 RX ADMIN — FAMOTIDINE 20 MG: 20 TABLET ORAL at 10:28

## 2019-04-13 RX ADMIN — FUROSEMIDE 20 MG: 10 INJECTION, SOLUTION INTRAVENOUS at 10:28

## 2019-04-13 RX ADMIN — APIXABAN 5 MG: 5 TABLET, FILM COATED ORAL at 10:28

## 2019-04-13 RX ADMIN — LEVOTHYROXINE SODIUM 88 MCG: 88 TABLET ORAL at 06:51

## 2019-04-13 RX ADMIN — SODIUM CHLORIDE, PRESERVATIVE FREE 10 ML: 5 INJECTION INTRAVENOUS at 10:29

## 2019-04-13 RX ADMIN — METOPROLOL TARTRATE 12.5 MG: 25 TABLET ORAL at 10:28

## 2019-04-13 ASSESSMENT — PAIN SCALES - GENERAL
PAINLEVEL_OUTOF10: 0

## 2019-04-13 NOTE — PROGRESS NOTES
Dr. Mark Izaguirre  Referral Date : 04/12/19  Diagnosis: Vertigo  Follows Commands: Within Functional Limits  General Comment  Comments: Family present; patient has implemented safety steps in home including bed on first floor and does not use steps unless with family  Subjective  Subjective: no c/o dizziness, numbness, tingling; just overall weakness/heaviness of bilateral UE; hx of OA bilateral shoulders  Pain Screening  Patient Currently in Pain: Denies          Orientation     Social/Functional History  Social/Functional History  Lives With: Son  Type of Home: House  Home Layout: Multi-level  Home Access: Stairs to enter with rails  Entrance Stairs - Number of Steps: 3  Bathroom Shower/Tub: Tub/Shower unit  Bathroom Toilet: Standard  Bathroom Equipment: Grab bars in shower, 3-in-1 commode  Home Equipment: Rolling walker, 4 wheeled walker  Receives Help From: Family  ADL Assistance: [address] [place] [address] Avenue: Independent  Homemaking Responsibilities: Yes  Laundry Responsibility: No  Ambulation Assistance: Independent  Transfer Assistance: Independent  Active : No  Additional Comments: family lives close and helps with shopping  Cognition        Objective          PROM RLE (degrees)  RLE PROM: WNL  AROM RLE (degrees)  RLE AROM: Exceptions  RLE General AROM: cautious with L knee AROM  PROM LLE (degrees)  LLE PROM: WNL  AROM LLE (degrees)  LLE AROM : WNL  Strength RLE  Strength RLE: WFL  Strength LLE  Strength LLE: WFL  Tone RLE  RLE Tone: Normotonic     Bed mobility  Bridging: Modified independent   Rolling to Right: Modified independent  Supine to Sit: Modified independent  Sit to Supine: Modified independent  Scooting: Modified independent  Comment: hospital bed rail  Transfers  Sit to Stand: Stand by assistance  Stand to sit:  Independent  Bed to Chair: Stand by assistance  Stand Pivot Transfers: Stand by assistance  Ambulation  Ambulation?: Yes  WB Status: FWB  More Ambulation?: No  Ambulation 1  Surface: level tile  Device: Single point cane  Assistance: Contact guard assistance  Quality of Gait: symmetrical and normal pace  Distance: 200+  Stairs/Curb  Stairs?: No  Gait Deviations  Gait Deviations: None     Balance  Posture: Good  Sitting - Static: Good  Sitting - Dynamic: Good  Standing - Static: Good  Standing - Dynamic: Good        Plan   Plan  Times per week: 5  Times per day: Daily  Plan weeks: 2  Specific instructions for Next Treatment: gait and steps  Current Treatment Recommendations: Strengthening, Gait Training, Endurance Training, Stair training, Home Exercise Program  Safety Devices  Type of devices: Left in chair  Restraints  Initially in place: No            Goals  Short term goals  Time Frame for Short term goals: 1 week  Short term goal 1: Patient to ambulate independently with single point cane on level surface x 200'  Short term goal 2: Patient to transfer independently from all surfaces  Short term goal 3:  Independent bed mobility without use of bed rail  Long term goals  Time Frame for Long term goals : 2 weeks  Long term goal 1: Patient to navigate 3 steps with one rail support, single point cane and SBA   Long term goal 2: Patient to be knowledgeable of HEP to improve overall endurance  Patient Goals   Patient goals : return home at previous functional level       Therapy Time   Individual Concurrent Group Co-treatment   Time In 0945         Time Out 1015         Minutes 30         Timed Code Treatment Minutes: Rogerio 60, PT

## 2019-04-13 NOTE — PROGRESS NOTES
Occupational Therapy   Occupational Therapy Initial Assessment  Date: 2019   Patient Name: Morena Humphries  MRN: [id number]     : 1934    Date of Service: 2019    Discharge Recommendations:  Home with assist PRN       Assessment   Performance deficits / Impairments: Decreased functional mobility ; Decreased strength;Decreased balance  Assessment: skilled OT needed to increase safety and indep. with functional mobility and ADL for safe return to prior level of function  Treatment Diagnosis: weakness  Prognosis: Good  Decision Making: Medium Complexity  History: expanded   Exam: 3 performance deficits  Assistance / Modification: min asssit for tasks  Barriers to Learning: none  REQUIRES OT FOLLOW UP: Yes  Activity Tolerance  Activity Tolerance: Patient Tolerated treatment well  Safety Devices  Safety Devices in place: Yes  Type of devices: All fall risk precautions in place           Patient Diagnosis(es): The primary encounter diagnosis was Dizziness. Diagnoses of Vertigo, Other fatigue, and Neck pain were also pertinent to this visit. has a past medical history of Aneurysm (arteriovenous) of coronary vessels, Aneurysm ([address] Arthritis, Cancer ([facility] [address] Cerebral artery occlusion with cerebral infarction ([address] Chronic kidney disease, Hx of blood clots, Hyperlipidemia, Hypertension, Pneumonia, Thyroid disease, and Vertigo. has a past surgical history that includes Cholecystectomy; Appendectomy; Hysterectomy; and Vein bypass surgery.     Treatment Diagnosis: weakness      Restrictions: none       Subjective   General  Chart Reviewed: Yes  Patient assessed for rehabilitation services?: Yes  Pain Assessment  Pain Assessment: 0-10  Pain Level: 0  Social/Functional History  Social/Functional History  Lives With: Son  Type of Home: House  Home Layout: Multi-level  Home Access: Stairs to enter with rails  Entrance Stairs - Number of Steps: 3  Bathroom Shower/Tub: Tub/Shower unit  Bathroom Toilet: Standard  Bathroom Equipment: Grab bars in shower, 3-in-1 commode  Home Equipment: Rolling walker, 4 wheeled walker  Receives Help From: Family  ADL Assistance: Independent  Homemaking Assistance: Independent  Homemaking Responsibilities: Yes  Laundry Responsibility: No  Ambulation Assistance: Independent  Transfer Assistance: Independent  Active : No  Additional Comments: family lives close and helps with shopping       Objective   Vision: Within Functional Limits  Hearing: Within functional limits    Orientation  Overall Orientation Status: Within Normal Limits     Balance  Sitting Balance: Stand by assistance  Standing Balance: Contact guard assistance  Standing Balance  Sit to stand: Stand by assistance  Stand to sit: Stand by assistance  Functional Mobility  Functional - Mobility Device: Cane  Activity: Other  Assist Level: Contact guard assistance  ADL  Feeding: Independent  Grooming: Stand by assistance  UE Bathing: Contact guard assistance  LE Bathing: Contact guard assistance  UE Dressing: Stand by assistance  LE Dressing: Contact guard assistance  Toileting: Contact guard assistance  Tone RUE  RUE Tone: Normotonic  Tone LUE  LUE Tone: Normotonic  Coordination  Movements Are Fluid And Coordinated: Yes        Transfers  Sit to stand: Stand by assistance  Stand to sit: Stand by assistance     Cognition  Overall Cognitive Status: WNL  Perception  Overall Perceptual Status: WFL     Sensation  Overall Sensation Status: WNL        LUE AROM (degrees)  LUE AROM : WNL  Left Hand AROM (degrees)  Left Hand AROM: WNL  RUE AROM (degrees)  RUE AROM : WNL  Right Hand AROM (degrees)  Right Hand AROM: WNL  LUE Strength  Gross LUE Strength: WFL  RUE Strength  Gross RUE Strength: WFL                   Plan   Plan  Times per week: 3+  Plan weeks: 2  Current Treatment Recommendations: Strengthening, Endurance Training, Self-Care / ADL, Safety Education & Training    OutComes Score    AM-PAC Score        AM-PAC Inpatient Daily Activity Raw Score: 21  AM-PAC Inpatient ADL T-Scale Score : 44.27  ADL Inpatient CMS 0-100% Score: 32.79  ADL Inpatient CMS G-Code Modifier : CJ    Goals  Short term goals  Time Frame for Short term goals: until discharge from hospital  Short term goal 1: Pt. will be indep. with self care tasks. Short term goal 2: Pt. will be indep. with functional transfers.        Therapy Time   Individual Concurrent Group Co-treatment   Time In 0945         Time Out 7545         Minutes 30                 Kaley Brown OTR/L

## 2019-04-14 NOTE — DISCHARGE SUMMARY
Discharge Summary    Name: Elma Mock  :  1934   MRN:  [id number]    Primary Care Doctor:  [id number] date:  2019    Discharge date:  2019    Admitting Physician: Halyey Martinez MD   Discharge Physician: Hayley Martinez MD    Reason for admission:  Below copied from H&P and no change required. \" Elma Mock is a 80 y.o. female who presents to the ED with family for complaints of dizziness, nausea, neck pain, onset this morning and appears to be resolved in ED. Patient has PMH of Aneurysm, Vertigo, CKD, hyperlipidemia, hypertension, and thyroid disease. Patient reports that she went to bed last night feeling fine. But when she woke up this morning, she felt lightheaded, dizzy with intermittent spinning sensation. Patient stated that she has a history of vertigo but her dizziness today is different because she also felt generalized weakness pain on her neck. She denies lateralized weakness, vision change, slurred speech and headache. Pt seen and examined. Patient denies CP, palpitation, fever, chills or diaphoresis. Denies cough, SOB or difficulty breathing. Denies any other symptoms including paresthesias, diarrhea, constipation, changes in bowels, dysuria, hematuria, frequency or urgency, B/L weakness, and recent illness. She also c/o mild abdominal discomfort and nausea without vomiting. Patient and family confirmed DNR-CC code status. Daughter stated that she will bring the living will papers. \"    Diagnosis / Hospital Course: The medical problems addressed during this hospitalization include following. acute dizziness/lightheadedness, suspect peripheral vertigo  - Her symptoms resolved spontaneously after admission. - CT of brain, MRI of brain did not show any acute abnormality. - carotid ultrasound was negative for carotid stenosis.   - unlikely to be orthostatic hypotension since we did not give her IV fluid, we actually diuresed her, but in spite the patient's symptoms improved. - The patient was given meclizine when necessary in case her dizziness recurs. - The patient tolerated ambulation upon discharge. Mild  CHF exacerbation   - the patient was given IV Lasix for having leg edema and elevated BNP. Her chest x-ray was negative for pulmonary edema. Stable chronic kidney disease    Physical Examination upon discharge:   Pt was personally examined by me on the day of discharge with the following findings:  /63   Pulse 91   Temp 96 °F (35.6 °C) (Oral)   Resp 21   Ht 5' 2\" (1.575 m)   Wt 189 lb 4.8 oz (85.9 kg)   LMP  (Approximate)   SpO2 100%   BMI 34.62 kg/m²   General: The patient appears as stated age. Well appearing, and in no distress. Mental status: Alert, Oriented x3. Coherent. No agitation. Eyes: DESIRAE. Normal conjunctiva. ENT/Mouth: normal appearing jaw and neck, no neck nodes or sinus tenderness. Clear oropharynx with moist mucous membrane. Cardiovascular:  normal rate, regular rhythm, normal S1, S2, no murmurs, rubs, clicks or gallops. No peripheral edema. Dorsal pedis pulses 2+ bilaterally. Respiratory: clear to auscultation, no wheezes, rales or rhonchi, symmetric air entry. Gastrointestinal: soft, nontender, nondistended, no masses or organomegaly. Genitourinary:  No CVA tenderness. Musculoskletal:  no clubbing or cyanosis. No joint swelling, warmth, or tenderness. Skin:  normal coloration and turgor, no rashes, no suspicious skin lesions noted.     Condition at the time of discharge:  Stable  Disposition: home  Discharge FOLLOW UP  Follow-up With  Details  Why  [address] [place] [address]  Schedule an appointment as soon as possible for a visit             Discharge Medications:       Joseluis Alicea   Home Medication Instructions MANDY:[id number]    Printed on:04/14/19 8654   Medication Information                      apixaban (ELIQUIS) 5 MG TABS tablet  Take 5 mg by mouth 2 times daily atorvastatin (LIPITOR) 10 MG tablet  Take 10 mg by mouth daily             clopidogrel (PLAVIX) 75 MG tablet  Take 75 mg by mouth daily             ergocalciferol (ERGOCALCIFEROL) 42413 units capsule  Take 50,000 Units by mouth once a week             furosemide (LASIX) 20 MG tablet  Take 20 mg by mouth 2 times daily             meclizine (ANTIVERT) 12.5 MG tablet  Take 1 tablet by mouth 3 times daily as needed for Dizziness             metoprolol tartrate (LOPRESSOR) 25 MG tablet  Take 0.5 tablets by mouth 2 times daily             Misc. Devices [facility]) MISC  1 Units by Does not apply route once for 1 dose             SYNTHROID 88 MCG tablet  Take 1 tablet by mouth Daily                 Consults:  IP CONSULT TO HOSPITALIST    Significant Procedures:  none    Significant Diagnostic Studies:   Lab Results   Component Value Date    WBC 4.9 04/13/2019    HGB 13.5 04/13/2019    HCT 42.8 04/13/2019    MCV 96.6 04/13/2019     04/13/2019     Lab Results   Component Value Date     04/13/2019    K 4.0 04/13/2019     04/13/2019    CO2 26 04/13/2019    BUN 19 04/13/2019    CREATININE 1.0 04/13/2019    GLUCOSE 126 (H) 04/13/2019    CALCIUM 9.0 04/13/2019    PROT 5.3 (L) 04/12/2019    LABALBU 3.0 (L) 04/12/2019    BILITOT 0.7 04/12/2019    ALKPHOS 82 04/12/2019    AST 13 (L) 04/12/2019    ALT 14 04/12/2019    LABGLOM 53 (L) 04/13/2019    GFRAA >60 04/13/2019       Ct Head Wo Contrast    Result Date: 4/12/2019  EXAMINATION: CT OF THE HEAD WITHOUT CONTRAST  4/12/2019 10:35 am TECHNIQUE: CT of the head was performed without the administration of intravenous contrast. Dose modulation, iterative reconstruction, and/or weight based adjustment of the mA/kV was utilized to reduce the radiation dose to as low as reasonably achievable. COMPARISON: None. HISTORY: ORDERING SYSTEM PROVIDED HISTORY: STROKE TECHNOLOGIST PROVIDED HISTORY: Has a \"code stroke\" or \"stroke alert\" been called? ->Yes Ordering Physician Provided Reason for Exam: dizziness, weakness Acuity: Acute Type of Exam: Initial Additional signs and symptoms: stroke alert Relevant Medical/Surgical History: none FINDINGS: BRAIN/VENTRICLES: There is no acute infarct or acute intracranial hemorrhage present. There is no mass effect or midline shift present. There is no ventriculomegaly or abnormal extra-axial fluid collection present. There is mild periventricular hypoattenuation consistent with mild chronic small vessel ischemic changes. ORBITS: Limited evaluation of the orbits is unremarkable. SINUSES: The paranasal sinuses and mastoid air cells are clear. SOFT TISSUES/SKULL:  No lytic or blastic osseous lesions are identified. No acute intracranial process identified. The above findings were discussed Dr. Evin Cheatham at 11 a.m. on 04/12/2019. Xr Chest Portable    Result Date: 4/12/2019  EXAMINATION: SINGLE XRAY VIEW OF THE CHEST 4/12/2019 10:15 am COMPARISON: 01/31/2017 HISTORY: ORDERING SYSTEM PROVIDED HISTORY: pain or SOB TECHNOLOGIST PROVIDED HISTORY: Reason for exam:->pain or SOB Ordering Physician Provided Reason for Exam: chest pain, weakness Acuity: Acute Type of Exam: Initial Relevant Medical/Surgical History: HTN FINDINGS: Cardiac and mediastinal silhouettes appear within normal limits for size. Pulmonary vascularity appears normal.  No parenchymal consolidation or pleural effusion is identified. No acute cardiopulmonary process. No acute osseous abnormality is identified. Degenerative changes are seen within the spine and shoulders. The chest appears clear without acute cardiopulmonary process. Vascular Carotid Duplex Bilateral    Result Date: 4/12/2019  EXAMINATION: ULTRASOUND EVALUATION OF THE CAROTID ARTERIES 4/12/2019 COMPARISON: None.  HISTORY: ORDERING SYSTEM PROVIDED HISTORY: stroke r/o, dizziness TECHNOLOGIST PROVIDED HISTORY: Reason for exam:->stroke r/o, dizziness Ordering Physician Provided Reason for Exam: dizziness Acuity: Acute Type of Exam: Initial Relevant Medical/Surgical History: no previous US FINDINGS: RIGHT: The right common carotid artery demonstrates peak systolic velocities of 91 and 61 cm/sec in the proximal and distal segments respectively. The right internal carotid artery demonstrates the systolic velocities of 62, 68, and 72 cm/sec in the proximal, mid and distal segments respectively. The external carotid artery is patent. The vertebral artery demonstrates normal antegrade flow. Atherosclerotic disease is present at the bifurcation. ICA/CCA ratio of 0.8. LEFT: The left common carotid artery demonstrates peak systolic velocities of 63, and 53 cm/sec in the proximal and distal segments respectively. The left internal carotid artery demonstrates the systolic velocities of 39, 72, and 66 cm/sec in the proximal, mid and distal segments respectively. The external carotid artery is patent. The vertebral artery demonstrates normal antegrade flow. Atherosclerotic disease is present at the bifurcation. ICA/CCA ratio of 1.1.     1. The right internal carotid artery demonstrates 0-50% stenosis . 2. The left internal carotid artery demonstrates 0-50% stenosis . 3. Bilateral vertebral arteries are patent with flow in the normal direction. 4. Atherosclerotic disease at the bilateral carotid bifurcation. Cta Neck W Contrast    Result Date: 4/12/2019  EXAMINATION: CTA OF THE NECK; CTA OF THE HEAD WITH CONTRAST 4/12/2019 10:44 am TECHNIQUE: CTA of the neck was performed with the administration of intravenous contrast. Multiplanar reformatted images are provided for review. MIP images are provided for review. Stenosis of the internal carotid arteries measured using NASCET criteria.  Dose modulation, iterative reconstruction, and/or weight based adjustment of the mA/kV was utilized to reduce the radiation dose to as low as reasonably achievable.; CTA of the head/brain was performed with the administration of intravenous contrast. Multiplanar reformatted images are provided for review. MIP images are provided for review. Dose modulation, iterative reconstruction, and/or weight based adjustment of the mA/kV was utilized to reduce the radiation dose to as low as reasonably achievable. COMPARISON: None HISTORY: ORDERING SYSTEM PROVIDED HISTORY: STROKE TECHNOLOGIST PROVIDED HISTORY: Has a \"code stroke\" or \"stroke alert\" been called? ->Yes Ordering Physician Provided Reason for Exam: dizziness, weakness Acuity: Acute Type of Exam: Initial Additional signs and symptoms: stroke alert Relevant Medical/Surgical History: Contrast used from cta head; ORDERING SYSTEM PROVIDED HISTORY: STROKE TECHNOLOGIST PROVIDED HISTORY: Has a \"code stroke\" or \"stroke alert\" been called? ->Yes Ordering Physician Provided Reason for Exam: dizziness, weakness Acuity: Acute Type of Exam: Initial Additional signs and symptoms: stroke alert Relevant Medical/Surgical History: 100ml isovue 370 FINDINGS: CTA NECK: AORTIC ARCH/ARCH VESSELS: There is a normal branch pattern of the aortic arch. No significant stenosis is seen of the innominate artery or subclavian arteries. CAROTID ARTERIES: The common carotid arteries are without flow limiting stenosis. The internal carotid arteries demonstrate beaded appearance bilaterally, left more than right without flow limiting stenosis. No dissection or arterial injury is seen. VERTEBRAL ARTERIES: The vertebral arteries both arise from the subclavian arteries. The left vertebral artery is dominant. The right vertebral artery is non dominant. No flow-limiting stenosis in the cervical vertebral arteries. SOFT TISSUES: The lung apices are clear. No cervical or superior mediastinal lymphadenopathy. The visualized portion of the larynx and pharynx appear unremarkable. The parotid, submandibular and thyroid glands demonstrate no acute abnormality. The thyroid gland is atrophic.  BONES: Multilevel degenerative disc disease throughout the cervical spine. CTA HEAD: ANTERIOR CIRCULATION: The internal carotid arteries are normal in course and caliber without focal stenosis. Calcified atherosclerotic plaque in the cavernous segments of the internal carotid arteries bilaterally. The anterior cerebral and middle cerebral arteries demonstrate no focal stenosis. 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 POSTERIOR CIRCULATION: The left vertebral artery is dominant intracranially. The right vertebral artery is nondominant and terminates primarily as a PICA. The basilar artery and posterior cerebral arteries are patent without focal stenosis. BRAIN: No mass effect or midline shift. No abnormal extra-axial fluid collection. The gray-white differentiation appears grossly maintained. 1. No hemodynamically significant stenosis or branch occlusion in the cervical or intracranial arterial circulation. 2. Beaded appearance of the internal carotid arteries bilaterally, left more than right compatible with fibromuscular dysplasia. 3. 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 Cta Head W Contrast    Result Date: 4/12/2019  EXAMINATION: CTA OF THE NECK; CTA OF THE HEAD WITH CONTRAST 4/12/2019 10:44 am TECHNIQUE: CTA of the neck was performed with the administration of intravenous contrast. Multiplanar reformatted images are provided for review. MIP images are provided for review. Stenosis of the internal carotid arteries measured using NASCET criteria. Dose modulation, iterative reconstruction, and/or weight based adjustment of the mA/kV was utilized to reduce the radiation dose to as low as reasonably achievable.; CTA of the head/brain was performed with the administration of intravenous contrast. Multiplanar reformatted images are provided for review. MIP images are provided for review.  Dose modulation, iterative reconstruction, and/or weight based adjustment of the mA/kV was utilized to reduce the radiation dose to as low as reasonably achievable. COMPARISON: None HISTORY: ORDERING SYSTEM PROVIDED HISTORY: STROKE TECHNOLOGIST PROVIDED HISTORY: Has a \"code stroke\" or \"stroke alert\" been called? ->Yes Ordering Physician Provided Reason for Exam: dizziness, weakness Acuity: Acute Type of Exam: Initial Additional signs and symptoms: stroke alert Relevant Medical/Surgical History: Contrast used from cta head; ORDERING SYSTEM PROVIDED HISTORY: STROKE TECHNOLOGIST PROVIDED HISTORY: Has a \"code stroke\" or \"stroke alert\" been called? ->Yes Ordering Physician Provided Reason for Exam: dizziness, weakness Acuity: Acute Type of Exam: Initial Additional signs and symptoms: stroke alert Relevant Medical/Surgical History: 100ml isovue 370 FINDINGS: CTA NECK: AORTIC ARCH/ARCH VESSELS: There is a normal branch pattern of the aortic arch. No significant stenosis is seen of the innominate artery or subclavian arteries. CAROTID ARTERIES: The common carotid arteries are without flow limiting stenosis. The internal carotid arteries demonstrate beaded appearance bilaterally, left more than right without flow limiting stenosis. No dissection or arterial injury is seen. VERTEBRAL ARTERIES: The vertebral arteries both arise from the subclavian arteries. The left vertebral artery is dominant. The right vertebral artery is non dominant. No flow-limiting stenosis in the cervical vertebral arteries. SOFT TISSUES: The lung apices are clear. No cervical or superior mediastinal lymphadenopathy. The visualized portion of the larynx and pharynx appear unremarkable. The parotid, submandibular and thyroid glands demonstrate no acute abnormality. The thyroid gland is atrophic. BONES: Multilevel degenerative disc disease throughout the cervical spine. CTA HEAD: ANTERIOR CIRCULATION: The internal carotid arteries are normal in course and caliber without focal stenosis.   Calcified atherosclerotic plaque in the cavernous segments of the internal carotid arteries bilaterally. The anterior cerebral and middle cerebral arteries demonstrate no focal stenosis. 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 POSTERIOR CIRCULATION: The left vertebral artery is dominant intracranially. The right vertebral artery is nondominant and terminates primarily as a PICA. The basilar artery and posterior cerebral arteries are patent without focal stenosis. BRAIN: No mass effect or midline shift. No abnormal extra-axial fluid collection. The gray-white differentiation appears grossly maintained. 1. No hemodynamically significant stenosis or branch occlusion in the cervical or intracranial arterial circulation. 2. Beaded appearance of the internal carotid arteries bilaterally, left more than right compatible with fibromuscular dysplasia. 3. 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 Mri Brain Without Contrast    Result Date: 4/13/2019  EXAMINATION: MRI OF THE BRAIN WITHOUT CONTRAST  4/13/2019 12:10 pm TECHNIQUE: Multiplanar multisequence MRI of the brain was performed without the administration of intravenous contrast. COMPARISON: CTA head and neck on 04/12/2019. HISTORY: ORDERING SYSTEM PROVIDED HISTORY: STROKE TECHNOLOGIST PROVIDED HISTORY: Ordering Physician Provided Reason for Exam: c/o stroke like symptoms; nkt, no sx to tawana per pt. JG Acuity: Unknown Type of Exam: Unknown FINDINGS: INTRACRANIAL STRUCTURES/VENTRICLES: There is no acute infarct. There is mild amount of nonspecific T2 hyperintense white matter lesions bilaterally, which are most often attributed to chronic small vessel ischemic white matter disease. Mild prominence of ventricles and sulci is compatible with cerebral volume loss. No abnormal susceptibility artifact is identified. No midline shift. No significant mass effect.  ORBITS: The visualized portion of the orbits demonstrate no acute abnormality. SINUSES: The visualized paranasal sinuses and mastoid air cells are well aerated. BONES/SOFT TISSUES: No significant abnormalities. 1. No acute infarct, intracranial hemorrhage, or significant mass effect. 2. Chronic small vessel ischemic white matter disease and diffuse cerebral volume loss. Time Spent on discharge is 40 minutes discussing plan of care and discharge medications with patient and nursing staff.     Please send a copy of this discharge summary to [facility] and all consultants above    Electronically signed by aMnav Ribera MD on 4/14/2019 at 4:24 PM

## 2019-04-18 LAB
EKG DIAGNOSIS: NORMAL
EKG Q-T INTERVAL: 394 MS
EKG QRS DURATION: 74 MS
EKG QTC CALCULATION (BAZETT): 457 MS
EKG R AXIS: 26 DEGREES
EKG T AXIS: 74 DEGREES
EKG VENTRICULAR RATE: 81 BPM